# Patient Record
Sex: FEMALE | Race: WHITE | NOT HISPANIC OR LATINO
[De-identification: names, ages, dates, MRNs, and addresses within clinical notes are randomized per-mention and may not be internally consistent; named-entity substitution may affect disease eponyms.]

---

## 2018-05-29 ENCOUNTER — RESULT REVIEW (OUTPATIENT)
Age: 23
End: 2018-05-29

## 2018-05-31 ENCOUNTER — TRANSCRIPTION ENCOUNTER (OUTPATIENT)
Age: 23
End: 2018-05-31

## 2018-05-31 ENCOUNTER — OUTPATIENT (OUTPATIENT)
Dept: OUTPATIENT SERVICES | Facility: HOSPITAL | Age: 23
LOS: 1 days | End: 2018-05-31

## 2018-05-31 VITALS
TEMPERATURE: 97 F | RESPIRATION RATE: 15 BRPM | WEIGHT: 123.9 LBS | HEIGHT: 65 IN | HEART RATE: 75 BPM | DIASTOLIC BLOOD PRESSURE: 62 MMHG | SYSTOLIC BLOOD PRESSURE: 98 MMHG

## 2018-05-31 DIAGNOSIS — N63.20 UNSPECIFIED LUMP IN THE LEFT BREAST, UNSPECIFIED QUADRANT: ICD-10-CM

## 2018-05-31 DIAGNOSIS — Z98.890 OTHER SPECIFIED POSTPROCEDURAL STATES: Chronic | ICD-10-CM

## 2018-05-31 DIAGNOSIS — N63.0 UNSPECIFIED LUMP IN UNSPECIFIED BREAST: ICD-10-CM

## 2018-05-31 LAB
HCT VFR BLD CALC: 38.9 % — SIGNIFICANT CHANGE UP (ref 34.5–45)
HGB BLD-MCNC: 12.1 G/DL — SIGNIFICANT CHANGE UP (ref 11.5–15.5)
MCHC RBC-ENTMCNC: 25.5 PG — LOW (ref 27–34)
MCHC RBC-ENTMCNC: 31.1 % — LOW (ref 32–36)
MCV RBC AUTO: 81.9 FL — SIGNIFICANT CHANGE UP (ref 80–100)
NRBC # FLD: 0 — SIGNIFICANT CHANGE UP
PLATELET # BLD AUTO: 173 K/UL — SIGNIFICANT CHANGE UP (ref 150–400)
PMV BLD: 12.3 FL — SIGNIFICANT CHANGE UP (ref 7–13)
RBC # BLD: 4.75 M/UL — SIGNIFICANT CHANGE UP (ref 3.8–5.2)
RBC # FLD: 13.4 % — SIGNIFICANT CHANGE UP (ref 10.3–14.5)
WBC # BLD: 6.49 K/UL — SIGNIFICANT CHANGE UP (ref 3.8–10.5)
WBC # FLD AUTO: 6.49 K/UL — SIGNIFICANT CHANGE UP (ref 3.8–10.5)

## 2018-05-31 RX ORDER — SODIUM CHLORIDE 9 MG/ML
1000 INJECTION, SOLUTION INTRAVENOUS
Qty: 0 | Refills: 0 | Status: DISCONTINUED | OUTPATIENT
Start: 2018-06-01 | End: 2018-06-01

## 2018-05-31 NOTE — H&P PST ADULT - ASSESSMENT
21 y/o female with left breast which  she recently discovered . Breast mass discovered on imaging.  Now scheduled for Excisional biopsy, left breast mass 6/1/18.

## 2018-05-31 NOTE — H&P PST ADULT - PROBLEM SELECTOR PLAN 1
Excisional biopsy, left breast mass 6/1/18.     CBC    Pre-op instructions and antibacterial  soap given and explained

## 2018-05-31 NOTE — H&P PST ADULT - FAMILY HISTORY
Mother  Still living? Yes, Estimated age: Age Unknown  Cardiac arrhythmia, Age at diagnosis: Age Unknown

## 2018-05-31 NOTE — H&P PST ADULT - NSANTHOSAYNRD_GEN_A_CORE
No. ANAHI screening performed.  STOP BANG Legend: 0-2 = LOW Risk; 3-4 = INTERMEDIATE Risk; 5-8 = HIGH Risk

## 2018-05-31 NOTE — H&P PST ADULT - SKIN/BREAST COMMENTS
Pt discovered left breast lump . Breast mass discovered on imaging.  Now scheduled for Excisional biopsy, left breast mass 6/1/18. Pt recently discovered left breast lump. Breast mass discovered on imaging.  Now scheduled for Excisional biopsy, left breast mass 6/1/18.

## 2018-05-31 NOTE — H&P PST ADULT - HISTORY OF PRESENT ILLNESS
23 y/o female with left breast which  she recently discovered . Breast mass discovered on imaging.  Now scheduled for Excisional biopsy, left breast mass 6/1/18.

## 2018-06-01 ENCOUNTER — RESULT REVIEW (OUTPATIENT)
Age: 23
End: 2018-06-01

## 2018-06-01 ENCOUNTER — OUTPATIENT (OUTPATIENT)
Dept: OUTPATIENT SERVICES | Facility: HOSPITAL | Age: 23
LOS: 1 days | Discharge: HOME | End: 2018-06-01
Payer: COMMERCIAL

## 2018-06-01 ENCOUNTER — TRANSCRIPTION ENCOUNTER (OUTPATIENT)
Age: 23
End: 2018-06-01

## 2018-06-01 VITALS
WEIGHT: 123.9 LBS | OXYGEN SATURATION: 99 % | HEIGHT: 65 IN | TEMPERATURE: 98 F | DIASTOLIC BLOOD PRESSURE: 78 MMHG | SYSTOLIC BLOOD PRESSURE: 112 MMHG | HEART RATE: 92 BPM | RESPIRATION RATE: 18 BRPM

## 2018-06-01 VITALS
TEMPERATURE: 98 F | HEART RATE: 69 BPM | SYSTOLIC BLOOD PRESSURE: 114 MMHG | OXYGEN SATURATION: 100 % | DIASTOLIC BLOOD PRESSURE: 55 MMHG | RESPIRATION RATE: 18 BRPM

## 2018-06-01 DIAGNOSIS — Z98.890 OTHER SPECIFIED POSTPROCEDURAL STATES: Chronic | ICD-10-CM

## 2018-06-01 DIAGNOSIS — N63.0 UNSPECIFIED LUMP IN UNSPECIFIED BREAST: ICD-10-CM

## 2018-06-01 PROCEDURE — 88305 TISSUE EXAM BY PATHOLOGIST: CPT | Mod: 26

## 2018-06-01 RX ORDER — OXYCODONE HYDROCHLORIDE 5 MG/1
1 TABLET ORAL
Qty: 20 | Refills: 0 | OUTPATIENT
Start: 2018-06-01

## 2018-06-01 RX ORDER — ACETAMINOPHEN 500 MG
2 TABLET ORAL
Qty: 0 | Refills: 0 | COMMUNITY

## 2018-06-01 RX ORDER — SODIUM CHLORIDE 9 MG/ML
1000 INJECTION, SOLUTION INTRAVENOUS
Qty: 0 | Refills: 0 | Status: DISCONTINUED | OUTPATIENT
Start: 2018-06-01 | End: 2018-06-02

## 2018-06-01 RX ORDER — SODIUM CHLORIDE 9 MG/ML
1000 INJECTION, SOLUTION INTRAVENOUS
Qty: 0 | Refills: 0 | Status: DISCONTINUED | OUTPATIENT
Start: 2018-06-01 | End: 2018-06-01

## 2018-06-01 RX ADMIN — SODIUM CHLORIDE 75 MILLILITER(S): 9 INJECTION, SOLUTION INTRAVENOUS at 11:45

## 2018-06-01 RX ADMIN — SODIUM CHLORIDE 30 MILLILITER(S): 9 INJECTION, SOLUTION INTRAVENOUS at 09:35

## 2018-06-01 NOTE — ASU DISCHARGE PLAN (ADULT/PEDIATRIC). - NOTIFY
Bleeding that does not stop/Pain not relieved by Medications/Fever greater than 101 Fever greater than 101/Persistent Nausea and Vomiting/Pain not relieved by Medications/Bleeding that does not stop

## 2018-06-01 NOTE — BRIEF OPERATIVE NOTE - PROCEDURE
<<-----Click on this checkbox to enter Procedure Excisional biopsy of left breast  06/01/2018    Active  [id number]

## 2018-06-01 NOTE — ASU DISCHARGE PLAN (ADULT/PEDIATRIC). - ITEMS TO FOLLOWUP WITH YOUR PHYSICIAN'S
Please follow up with Dr. Echevarria in the office in about 1-2 weeks, please call the office to schedule an appointment.
Please follow up with Dr. Echevarria in about 2 weeks. Please call his office to schedule an appointment at your convenience.

## 2018-06-01 NOTE — ASU DISCHARGE PLAN (ADULT/PEDIATRIC). - NURSING INSTRUCTIONS
You received intravenous tylenol in the operating room at 11am. You may take additional tylenol products after 5pm.

## 2018-06-01 NOTE — ASU DISCHARGE PLAN (ADULT/PEDIATRIC). - NOTIFY
Fever greater than 101/Persistent Nausea and Vomiting/Unable to Urinate/Inability to Tolerate Liquids or Foods/Bleeding that does not stop

## 2018-06-01 NOTE — ASU DISCHARGE PLAN (ADULT/PEDIATRIC). - MEDICATION SUMMARY - MEDICATIONS TO TAKE
I will START or STAY ON the medications listed below when I get home from the hospital:    Tylenol 325 mg oral tablet  -- 2 tab(s) by mouth every 4 hours, As Needed - for moderate pain  -- Indication: For Pain Control
I will START or STAY ON the medications listed below when I get home from the hospital:    acetaminophen-oxyCODONE 325 mg-5 mg oral tablet  -- 1-2 tab(s) by mouth every 6 hours, As Needed -for severe pain MDD:8  -- Caution federal law prohibits the transfer of this drug to any person other  than the person for whom it was prescribed.  May cause drowsiness.  Alcohol may intensify this effect.  Use care when operating dangerous machinery.  This prescription cannot be refilled.  This product contains acetaminophen.  Do not use  with any other product containing acetaminophen to prevent possible liver damage.  Using more of this medication than prescribed may cause serious breathing problems.    -- Indication: For Pain Control

## 2018-06-05 LAB — SURGICAL PATHOLOGY STUDY: SIGNIFICANT CHANGE UP

## 2020-02-29 NOTE — ASU DISCHARGE PLAN (ADULT/PEDIATRIC). - PROCEDURE
[id number]  Patient A & O x4. VSS. Pt up with A1 TTWB LLE. Lung sounds . Patient denies chest pain, SOB, lightheadedness, dizziness, nausea, and vomiting. Bowel sounds active, passing flatus, and tolerating regular diet. Drinking well and voiding spontaneously without difficulties. PIV SL. Patient able to wiggle toes. CMS intact baseline neuropathy. Dressing clean, dry, and intact. Pain is tolerable and patient taking PO dilaudid and scheduled toradol for pain, ice pack applied.Plan is to discharge to home tomorrow depending on how pt does with therapies. Patient demonstrates ability to use call light appropriately. Will continue to monitor patient.      left breast biopsy

## 2020-04-26 ENCOUNTER — MESSAGE (OUTPATIENT)
Age: 25
End: 2020-04-26

## 2020-05-01 LAB
SARS-COV-2 IGG SERPL IA-ACNC: <0.1 INDEX
SARS-COV-2 IGG SERPL QL IA: NEGATIVE

## 2020-05-02 PROBLEM — N63.20 UNSPECIFIED LUMP IN THE LEFT BREAST, UNSPECIFIED QUADRANT: Chronic | Status: ACTIVE | Noted: 2018-05-31

## 2020-05-04 ENCOUNTER — APPOINTMENT (OUTPATIENT)
Dept: DISASTER EMERGENCY | Facility: CLINIC | Age: 25
End: 2020-05-04

## 2020-08-16 ENCOUNTER — EMERGENCY (EMERGENCY)
Facility: HOSPITAL | Age: 25
LOS: 1 days | Discharge: HOME | End: 2020-08-16
Attending: EMERGENCY MEDICINE
Payer: COMMERCIAL

## 2020-08-16 VITALS
SYSTOLIC BLOOD PRESSURE: 158 MMHG | OXYGEN SATURATION: 100 % | HEART RATE: 100 BPM | HEIGHT: 65 IN | RESPIRATION RATE: 16 BRPM | DIASTOLIC BLOOD PRESSURE: 98 MMHG | WEIGHT: 130.07 LBS | TEMPERATURE: 98 F

## 2020-08-16 VITALS
DIASTOLIC BLOOD PRESSURE: 84 MMHG | OXYGEN SATURATION: 99 % | SYSTOLIC BLOOD PRESSURE: 130 MMHG | RESPIRATION RATE: 18 BRPM | TEMPERATURE: 99 F | HEART RATE: 93 BPM

## 2020-08-16 DIAGNOSIS — Z98.890 OTHER SPECIFIED POSTPROCEDURAL STATES: Chronic | ICD-10-CM

## 2020-08-16 PROCEDURE — 28510 TREATMENT OF TOE FRACTURE: CPT | Mod: T2,54

## 2020-08-16 PROCEDURE — 73630 X-RAY EXAM OF FOOT: CPT

## 2020-08-16 PROCEDURE — 99283 EMERGENCY DEPT VISIT LOW MDM: CPT | Mod: 57

## 2020-08-16 PROCEDURE — 99283 EMERGENCY DEPT VISIT LOW MDM: CPT | Mod: 25

## 2020-08-16 PROCEDURE — 28510 TREATMENT OF TOE FRACTURE: CPT | Mod: T2

## 2020-08-16 NOTE — ED PROVIDER NOTE - NSFOLLOWUPINSTRUCTIONS_ED_ALL_ED_FT
1) Please follow-up with your primary care doctor in the next 2-3 days.  Please call tomorrow for an appointment.  If you cannot follow-up with your primary care doctor please return to the ED for any urgent issues. Please follow up with podiatry in 4-5 days  2) You were given a copy of the tests performed today.  Please bring the results with you and review them with your primary care doctor.  3) If you have any worsening of symptoms or any other concerns please return to the ED immediately. Such as but not limited to uncontrolled pain, numbness  4) Please continue taking your home medications as directed. Rest, apply ice over covered skin for no more than 15 minutes at a time, keep affected extremity elevated, use compressive dressing or splint as provided and instructed. Please take Motrin (Ibuprofen) 600mg by mouth every 6 hours as needed for pain. Please take this medication with food. Please take Tylenol (Acetaminophen) 1000 mg every 6 hours as needed for pain. Please do not exceed more than 4,000mg of Tylenol in a day

## 2020-08-16 NOTE — ED PROCEDURE NOTE - ATTENDING CONTRIBUTION TO CARE
Digital nerve block performed with 1% lidocaine. Reduced and raisa taped. NV intact following. Tolerated well. No complications. Pt applied post-op shoe which she was provided with. To f/u with podiatry. - Ole Hernandez MD

## 2020-08-16 NOTE — ED ADULT NURSE NOTE - NSIMPLEMENTINTERV_GEN_ALL_ED
Implemented All Fall Risk Interventions:  [place] to call system. Call bell, personal items and telephone within reach. Instruct patient to call for assistance. Room bathroom lighting operational. Non-slip footwear when patient is off stretcher. Physically safe environment: no spills, clutter or unnecessary equipment. Stretcher in lowest position, wheels locked, appropriate side rails in place. Provide visual cue, wrist band, yellow gown, etc. Monitor gait and stability. Monitor for mental status changes and reorient to person, place, and time. Review medications for side effects contributing to fall risk. Reinforce activity limits and safety measures with patient and family.

## 2020-08-16 NOTE — ED PROVIDER NOTE - ATTENDING CONTRIBUTION TO CARE
24F, no sig pmh, presents with L 3rd toe pain s/p injury. Pt was walking, stubbed toe against a box. +Pain, deformity. Pain worse with ambulating. No meds taken. No numbness. No discoloration. No other injuries.    PE: Well appearing young female in NAD, NCAT, MMM, No increased wob, RRR, LLE: 2+ DP pulse, foot warm, well perfused, +deformity 3rd digit, +ttp, flexion/extension all digits intact, cap refill <2 sec all digits, sensation intact all digits.    L 3rd toe deformity most c/w fracture. Lower likelihood dislocation. XRs. Analgesia. - Ole Hernandez MD

## 2020-08-16 NOTE — ED PROVIDER NOTE - PATIENT PORTAL LINK FT
You can access the FollowMyHealth Patient Portal offered by [facility] by registering at the following website: [email or web address]. By joining [facility]’s FollowMyHealth portal, you will also be able to view your health information using other applications (apps) compatible with our system.

## 2020-08-16 NOTE — ED PROVIDER NOTE - CLINICAL SUMMARY MEDICAL DECISION MAKING FREE TEXT BOX
Joelle Briones MD: 23 yo F no Pmh p/w pain of the left foot 3rd digit. will eval for fx or dislocation will get xray of the foot

## 2020-08-16 NOTE — ED PROVIDER NOTE - OBJECTIVE STATEMENT
Joelle Briones MD: 23 yo F no Pmh p/w pain of the left foot Joelle Briones MD: 23 yo F no Pmh p/w pain of the left foot 3rd digit. pt state that she stubbed her toe on a box a few hours ago. Pt has not taken any thing for her pain. Denies weakness or numbness.

## 2020-08-16 NOTE — ED PROVIDER NOTE - NSFOLLOWUPCLINICS_GEN_ALL_ED_FT
[facility]  Podiatry  [address] [place] [address] - [address] [place] [address]  Phone: [phone]  Fax:   Follow Up Time: 4-6 Days

## 2020-08-16 NOTE — ED ADULT NURSE NOTE - OBJECTIVE STATEMENT
complaining of toe pain after a trip and banging foot against the wall. Pt states, "I was going up to my room I tripped and almost fell and tried to save my fall and banged my foot against the wall. 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 Pt denies headache, blurred vision, lightheadedness, dizziness, SOB, chest pain, abdominal pain, N/V/D urinary symptoms, numbness and tingling at present. Pt has a small 2 mm abrasion to the third toe on the left foot. Pt has a hematoma present at the medial side of the third toe at present. Pt denies falling, hitting her head and LOC. There is no active bleeding at present.

## 2020-08-17 ENCOUNTER — TRANSCRIPTION ENCOUNTER (OUTPATIENT)
Age: 25
End: 2020-08-17

## 2020-08-17 PROCEDURE — 73630 X-RAY EXAM OF FOOT: CPT | Mod: 26,LT

## 2024-05-14 NOTE — ASU DISCHARGE PLAN (ADULT/PEDIATRIC). - KEEP DRESSING DRY
Thank you for choosing [facility] for your healthcare needs.  It was our pleasure to take care of you today!  Please follow the instructions provided to you after your procedure.    YOUR CARE TEAM TODAY WAS: Joanne STEINBERG RN    HANDOUTS GIVEN:  Tips for Feeling Better After Your Epidural Steroid Injection      If you have any questions or concerns, please call the Outpatient Surgery unit at [phone] Monday through Friday 6 AM to 6 PM. If you are unable to reach someone, please call your physician's office.     You may receive a patient satisfaction survey in the mail or by email following your visit.  Please take the time to complete this, as your feedback is very important to us.  We strive to make your experience exceptional and your comments help us with that goal.  We look forward to hearing from you!        Want to Say “Thank You” to a Nurse?  The CHRIS Award® was created in memory of HEATHER Jim by his family to say thank you to bedside nurses who provide an outstanding level of care.    Submit a nomination using any method below.     OR    [email or web address]  Or visit the Resource section   on your "[employer] kori     
Statement Selected
Statement Selected